# Patient Record
Sex: MALE | Race: WHITE | HISPANIC OR LATINO | Employment: OTHER | URBAN - METROPOLITAN AREA
[De-identification: names, ages, dates, MRNs, and addresses within clinical notes are randomized per-mention and may not be internally consistent; named-entity substitution may affect disease eponyms.]

---

## 2021-12-28 ENCOUNTER — HOSPITAL ENCOUNTER (EMERGENCY)
Facility: MEDICAL CENTER | Age: 36
End: 2021-12-28
Attending: EMERGENCY MEDICINE

## 2021-12-28 VITALS
RESPIRATION RATE: 18 BRPM | WEIGHT: 203.71 LBS | DIASTOLIC BLOOD PRESSURE: 85 MMHG | SYSTOLIC BLOOD PRESSURE: 140 MMHG | HEART RATE: 68 BPM | OXYGEN SATURATION: 100 % | TEMPERATURE: 99.1 F | HEIGHT: 68 IN | BODY MASS INDEX: 30.87 KG/M2

## 2021-12-28 DIAGNOSIS — R06.02 SHORTNESS OF BREATH: ICD-10-CM

## 2021-12-28 DIAGNOSIS — J98.01 BRONCHOSPASM: ICD-10-CM

## 2021-12-28 PROCEDURE — 700102 HCHG RX REV CODE 250 W/ 637 OVERRIDE(OP): Performed by: EMERGENCY MEDICINE

## 2021-12-28 PROCEDURE — 99283 EMERGENCY DEPT VISIT LOW MDM: CPT

## 2021-12-28 PROCEDURE — A9270 NON-COVERED ITEM OR SERVICE: HCPCS | Performed by: EMERGENCY MEDICINE

## 2021-12-28 RX ORDER — ALBUTEROL SULFATE 90 UG/1
2 AEROSOL, METERED RESPIRATORY (INHALATION) EVERY 6 HOURS PRN
Qty: 18 G | Refills: 1 | Status: SHIPPED | OUTPATIENT
Start: 2021-12-28

## 2021-12-28 RX ORDER — ALBUTEROL SULFATE 90 UG/1
2 AEROSOL, METERED RESPIRATORY (INHALATION) ONCE
Status: COMPLETED | OUTPATIENT
Start: 2021-12-28 | End: 2021-12-28

## 2021-12-28 RX ORDER — ALBUTEROL SULFATE 90 UG/1
2 AEROSOL, METERED RESPIRATORY (INHALATION) EVERY 6 HOURS PRN
Qty: 18 G | Refills: 1 | Status: SHIPPED | OUTPATIENT
Start: 2021-12-28 | End: 2021-12-28 | Stop reason: SDUPTHER

## 2021-12-28 RX ADMIN — ALBUTEROL SULFATE 2 PUFF: 90 AEROSOL, METERED RESPIRATORY (INHALATION) at 20:47

## 2021-12-29 NOTE — ED NOTES
Seen and examined by erp-orders recvd. Pt instructed in use of mdi spacer and used in front of rn. Vs as charted, update to erp

## 2021-12-29 NOTE — ED NOTES
Reviewed discharge paperwork with patient, verbalized understanding. No questions at this time. Paper prescription given to patient.

## 2021-12-29 NOTE — ED PROVIDER NOTES
"ED Provider Note    CHIEF COMPLAINT  Chief Complaint   Patient presents with   • Wheezing     wheezing and chest tightness after eating wontons and 7up        HPI  Dillon Gutierrez is a 36 y.o. male who presents with shortness of breath.  This started yesterday evening after eating pizza and drinking 7-Up.  He felt short of breath throughout the day although it was mild enough that he is able to shovel snow all day.  He then ate a chicken pot sticker and his shortness of breath worsened this evening although it is improved over the last hour.  History of childhood asthma or reactive airway disease but he does not have active asthma now.  He has a seafood allergy and describes that his throat closes but has not eaten seafood as the age of 12.  He does not have an EpiPen.  He is never needed EpiPen.  No swelling of the lips or tongue.  No rash or itching.  No dizziness.  No vomiting.    REVIEW OF SYSTEMS  Pertinent positives include: This of breath, possible wheezing, possible history of bronchospasm, seafood allergy.  Pertinent negatives include: Food ingestion, rash, fever, cough, loss of taste or smell.    PAST MEDICAL HISTORY  Past Medical History:   Diagnosis Date   • Asthma     as a child       SOCIAL HISTORY  Social History     Tobacco Use   • Smoking status: Never Smoker   • Smokeless tobacco: Not on file   Vaping Use   • Vaping Use: Never used   Substance Use Topics   • Alcohol use: Yes     Comment: occ   • Drug use: Never       CURRENT MEDICATIONS  Benadryl    ALLERGIES  Allergies   Allergen Reactions   • Shellfish Allergy      Wheezing and chest tightness       PHYSICAL EXAM  VITAL SIGNS: /63   Pulse 88   Temp 36.8 °C (98.2 °F) (Temporal)   Resp 18   Ht 1.727 m (5' 8\")   Wt 92.4 kg (203 lb 11.3 oz)   SpO2 95%   BMI 30.97 kg/m² . Reviewed and mildly elevated blood pressure  Constitutional :  Well developed, Well nourished, well-appearing, speaking in full sentences.   HNT: atraumatic, wearing " a mask.  No intraoral erythema or edema  Ears: external ears normal.  Eyes: pupils reactive without eye discharge nor conjunctival hyperemia.  Cardiovascular: Regular rhythm, No murmurs, No rubs, No gallops.  No cyanosis.   Respiratory: No rales, rhonchi, wheeze, cough, no stridor.  Abdomen:  Soft, nontender  Skin: Warm, dry, no erythema, no rash.   Musculoskeletal: no limb deformities.    INTERVENTIONS:  Medications   albuterol inhaler 2 Puff (has no administration in time range)     Response: Subjective improvement in dyspnea    COURSE & MEDICAL DECISION MAKING  Well-appearing patient presents with dyspnea without obvious cough or bronchospasm.  He still may have bronchospasm given his response to albuterol.  There is no evidence of anaphylaxis stridor or angioedema.  There is no known allergen exposure.  There is no evidence of Covid infection.    PLAN:  Albuterol MDI  Spacer training  Bronchospasm handout given  Return for worsening shortness of breath, swelling of the lips and tongue, dizziness, uncontrolled vomiting    [facility] ([facility])  [address] [place] [address], [address]  [facility] [address] [phone]  Schedule an appointment as soon as possible for a visit         CONDITION:  Good.    FINAL IMPRESSION:  1. Shortness of breath    2. Bronchospasm          Electronically signed by: Lester Damon M.D., 12/28/2021

## 2021-12-29 NOTE — ED NOTES
Patient states that he came in due to wheezing. Currently no wheezing. States did take a benadryl, as well as tums in case it is gerd. Wheezing has been off and on since yesterday. No pain.

## 2021-12-29 NOTE — DISCHARGE INSTRUCTIONS
Use albuterol as needed for cough shortness of breath or wheeze.  Follow-up with a new primary physician through the [facility].  Return to the ER for worsening shortness of breath despite albuterol, swelling of the lips and tongue, dizziness, uncontrolled vomiting.  Avoid seafood.

## 2021-12-29 NOTE — ED TRIAGE NOTES
Wheezing and chest tightness after eating wontons and 7up. He usually gets these symptoms after eating shellfish. No facial swelling noted.